# Patient Record
Sex: MALE | Race: BLACK OR AFRICAN AMERICAN | NOT HISPANIC OR LATINO
[De-identification: names, ages, dates, MRNs, and addresses within clinical notes are randomized per-mention and may not be internally consistent; named-entity substitution may affect disease eponyms.]

---

## 2018-03-23 ENCOUNTER — HOSPITAL (OUTPATIENT)
Dept: OTHER | Age: 38
End: 2018-03-23
Attending: EMERGENCY MEDICINE

## 2020-09-16 ENCOUNTER — OFFICE VISIT (OUTPATIENT)
Dept: OCCUPATIONAL MEDICINE | Age: 40
End: 2020-09-16
Attending: EMERGENCY MEDICINE

## 2022-06-29 ENCOUNTER — OCC HEALTH (OUTPATIENT)
Dept: OCCUPATIONAL MEDICINE | Age: 42
End: 2022-06-29
Attending: PHYSICIAN ASSISTANT

## 2022-06-29 ENCOUNTER — HOSPITAL ENCOUNTER (OUTPATIENT)
Dept: GENERAL RADIOLOGY | Age: 42
Discharge: HOME | End: 2022-06-29
Attending: PHYSICIAN ASSISTANT

## 2022-06-29 DIAGNOSIS — S60.112A CONTUSION OF LEFT THUMB WITH DAMAGE TO NAIL, INITIAL ENCOUNTER: Primary | ICD-10-CM

## 2022-06-29 DIAGNOSIS — S60.112A CONTUSION OF LEFT THUMB WITH DAMAGE TO NAIL, INITIAL ENCOUNTER: ICD-10-CM

## 2022-06-29 PROCEDURE — 73130 X-RAY EXAM OF HAND: CPT | Performed by: PHYSICIAN ASSISTANT

## 2022-07-22 ENCOUNTER — OCC HEALTH (OUTPATIENT)
Dept: OCCUPATIONAL MEDICINE | Age: 42
End: 2022-07-22
Attending: PHYSICIAN ASSISTANT

## 2022-07-22 DIAGNOSIS — S60.012D CONTUSION OF LEFT THUMB WITHOUT DAMAGE TO NAIL, SUBSEQUENT ENCOUNTER: Primary | ICD-10-CM

## 2022-10-29 ENCOUNTER — LAB ENCOUNTER (OUTPATIENT)
Dept: LAB | Age: 42
End: 2022-10-29
Attending: PHYSICIAN ASSISTANT
Payer: COMMERCIAL

## 2022-10-29 ENCOUNTER — OFFICE VISIT (OUTPATIENT)
Dept: INTERNAL MEDICINE CLINIC | Facility: CLINIC | Age: 42
End: 2022-10-29
Payer: COMMERCIAL

## 2022-10-29 VITALS
RESPIRATION RATE: 16 BRPM | TEMPERATURE: 98 F | WEIGHT: 295 LBS | BODY MASS INDEX: 34.83 KG/M2 | OXYGEN SATURATION: 96 % | HEIGHT: 77 IN | SYSTOLIC BLOOD PRESSURE: 126 MMHG | DIASTOLIC BLOOD PRESSURE: 76 MMHG | HEART RATE: 78 BPM

## 2022-10-29 DIAGNOSIS — E11.69 ERECTILE DYSFUNCTION ASSOCIATED WITH TYPE 2 DIABETES MELLITUS (HCC): ICD-10-CM

## 2022-10-29 DIAGNOSIS — Z00.00 ANNUAL PHYSICAL EXAM: Primary | ICD-10-CM

## 2022-10-29 DIAGNOSIS — Z00.00 ANNUAL PHYSICAL EXAM: ICD-10-CM

## 2022-10-29 DIAGNOSIS — N52.1 ERECTILE DYSFUNCTION ASSOCIATED WITH TYPE 2 DIABETES MELLITUS (HCC): ICD-10-CM

## 2022-10-29 DIAGNOSIS — E11.65 TYPE 2 DIABETES MELLITUS WITH HYPERGLYCEMIA, WITHOUT LONG-TERM CURRENT USE OF INSULIN (HCC): ICD-10-CM

## 2022-10-29 DIAGNOSIS — E66.9 OBESITY (BMI 30-39.9): ICD-10-CM

## 2022-10-29 DIAGNOSIS — R79.89 LOW TESTOSTERONE: ICD-10-CM

## 2022-10-29 LAB
ALT SERPL-CCNC: 32 U/L
ANION GAP SERPL CALC-SCNC: 3 MMOL/L (ref 0–18)
AST SERPL-CCNC: 19 U/L (ref 15–37)
BUN BLD-MCNC: 11 MG/DL (ref 7–18)
CALCIUM BLD-MCNC: 9.6 MG/DL (ref 8.5–10.1)
CHLORIDE SERPL-SCNC: 103 MMOL/L (ref 98–112)
CHOLEST SERPL-MCNC: 217 MG/DL (ref ?–200)
CO2 SERPL-SCNC: 31 MMOL/L (ref 21–32)
COMPLEXED PSA SERPL-MCNC: 0.64 NG/ML (ref ?–4)
CREAT BLD-MCNC: 1.14 MG/DL
CREAT UR-SCNC: 186 MG/DL
EST. AVERAGE GLUCOSE BLD GHB EST-MCNC: 292 MG/DL (ref 68–126)
FASTING PATIENT LIPID ANSWER: YES
FASTING STATUS PATIENT QL REPORTED: YES
GFR SERPLBLD BASED ON 1.73 SQ M-ARVRAT: 82 ML/MIN/1.73M2 (ref 60–?)
GLUCOSE BLD-MCNC: 186 MG/DL (ref 70–99)
HBA1C MFR BLD: 11.8 % (ref ?–5.7)
HCV AB SERPL QL IA: NONREACTIVE
HDLC SERPL-MCNC: 35 MG/DL (ref 40–59)
LDLC SERPL CALC-MCNC: 149 MG/DL (ref ?–100)
MICROALBUMIN UR-MCNC: 2.12 MG/DL
MICROALBUMIN/CREAT 24H UR-RTO: 11.4 UG/MG (ref ?–30)
NONHDLC SERPL-MCNC: 182 MG/DL (ref ?–130)
OSMOLALITY SERPL CALC.SUM OF ELEC: 288 MOSM/KG (ref 275–295)
POTASSIUM SERPL-SCNC: 4.6 MMOL/L (ref 3.5–5.1)
SODIUM SERPL-SCNC: 137 MMOL/L (ref 136–145)
TRIGL SERPL-MCNC: 180 MG/DL (ref 30–149)
TSI SER-ACNC: 0.65 MIU/ML (ref 0.36–3.74)
VLDLC SERPL CALC-MCNC: 34 MG/DL (ref 0–30)

## 2022-10-29 PROCEDURE — 3008F BODY MASS INDEX DOCD: CPT | Performed by: PHYSICIAN ASSISTANT

## 2022-10-29 PROCEDURE — 99204 OFFICE O/P NEW MOD 45 MIN: CPT | Performed by: PHYSICIAN ASSISTANT

## 2022-10-29 PROCEDURE — 3078F DIAST BP <80 MM HG: CPT | Performed by: PHYSICIAN ASSISTANT

## 2022-10-29 PROCEDURE — 84460 ALANINE AMINO (ALT) (SGPT): CPT | Performed by: PHYSICIAN ASSISTANT

## 2022-10-29 PROCEDURE — 82043 UR ALBUMIN QUANTITATIVE: CPT | Performed by: PHYSICIAN ASSISTANT

## 2022-10-29 PROCEDURE — 82570 ASSAY OF URINE CREATININE: CPT | Performed by: PHYSICIAN ASSISTANT

## 2022-10-29 PROCEDURE — 80048 BASIC METABOLIC PNL TOTAL CA: CPT | Performed by: PHYSICIAN ASSISTANT

## 2022-10-29 PROCEDURE — 84450 TRANSFERASE (AST) (SGOT): CPT | Performed by: PHYSICIAN ASSISTANT

## 2022-10-29 PROCEDURE — 84153 ASSAY OF PSA TOTAL: CPT | Performed by: PHYSICIAN ASSISTANT

## 2022-10-29 PROCEDURE — 3074F SYST BP LT 130 MM HG: CPT | Performed by: PHYSICIAN ASSISTANT

## 2022-10-29 PROCEDURE — 90677 PCV20 VACCINE IM: CPT | Performed by: PHYSICIAN ASSISTANT

## 2022-10-29 PROCEDURE — 80061 LIPID PANEL: CPT | Performed by: PHYSICIAN ASSISTANT

## 2022-10-29 PROCEDURE — 86803 HEPATITIS C AB TEST: CPT | Performed by: PHYSICIAN ASSISTANT

## 2022-10-29 PROCEDURE — 90471 IMMUNIZATION ADMIN: CPT | Performed by: PHYSICIAN ASSISTANT

## 2022-10-29 PROCEDURE — 84443 ASSAY THYROID STIM HORMONE: CPT | Performed by: PHYSICIAN ASSISTANT

## 2022-10-29 PROCEDURE — 83036 HEMOGLOBIN GLYCOSYLATED A1C: CPT | Performed by: PHYSICIAN ASSISTANT

## 2022-10-29 PROCEDURE — 99386 PREV VISIT NEW AGE 40-64: CPT | Performed by: PHYSICIAN ASSISTANT

## 2022-10-29 RX ORDER — SILDENAFIL 50 MG/1
50 TABLET, FILM COATED ORAL
Qty: 9 TABLET | Refills: 0 | Status: SHIPPED | OUTPATIENT
Start: 2022-10-29

## 2022-10-29 NOTE — PATIENT INSTRUCTIONS
Blood work today. See Dr. Alvina Deleon for a DIABETIC eye exam.    Start sildenafil (Viagra) for erectile dysfunction. Use [employer] coupon at pharmacy. Please focus on a diet consisting of lean or plant based proteins, fruits, veggies, and whole grains, as well as regular exercise (30 minutes daily). Follow up visit in 3-6 months (sooner pending lab results).

## 2022-10-31 ENCOUNTER — TELEPHONE (OUTPATIENT)
Dept: ENDOCRINOLOGY CLINIC | Facility: CLINIC | Age: 42
End: 2022-10-31

## 2022-10-31 DIAGNOSIS — E11.69 HYPERLIPIDEMIA ASSOCIATED WITH TYPE 2 DIABETES MELLITUS (HCC): ICD-10-CM

## 2022-10-31 DIAGNOSIS — E11.65 UNCONTROLLED TYPE 2 DIABETES MELLITUS WITH HYPERGLYCEMIA (HCC): Primary | ICD-10-CM

## 2022-10-31 DIAGNOSIS — E78.5 HYPERLIPIDEMIA ASSOCIATED WITH TYPE 2 DIABETES MELLITUS (HCC): ICD-10-CM

## 2022-10-31 RX ORDER — ATORVASTATIN CALCIUM 20 MG/1
20 TABLET, FILM COATED ORAL NIGHTLY
Qty: 90 TABLET | Refills: 1 | Status: SHIPPED | OUTPATIENT
Start: 2022-10-31

## 2022-11-02 ENCOUNTER — TELEPHONE (OUTPATIENT)
Dept: INTERNAL MEDICINE CLINIC | Facility: CLINIC | Age: 42
End: 2022-11-02

## 2022-11-02 ENCOUNTER — OFFICE VISIT (OUTPATIENT)
Dept: ENDOCRINOLOGY CLINIC | Facility: CLINIC | Age: 42
End: 2022-11-02
Payer: COMMERCIAL

## 2022-11-02 VITALS
RESPIRATION RATE: 16 BRPM | DIASTOLIC BLOOD PRESSURE: 86 MMHG | WEIGHT: 298 LBS | HEART RATE: 88 BPM | HEIGHT: 77 IN | BODY MASS INDEX: 35.19 KG/M2 | SYSTOLIC BLOOD PRESSURE: 138 MMHG

## 2022-11-02 DIAGNOSIS — E78.5 DYSLIPIDEMIA: ICD-10-CM

## 2022-11-02 DIAGNOSIS — E66.9 OBESITY (BMI 30-39.9): ICD-10-CM

## 2022-11-02 DIAGNOSIS — E11.65 TYPE 2 DIABETES MELLITUS WITH HYPERGLYCEMIA, WITHOUT LONG-TERM CURRENT USE OF INSULIN (HCC): Primary | ICD-10-CM

## 2022-11-02 LAB
GLUCOSE BLOOD: 280
TEST STRIP LOT #: NORMAL NUMERIC

## 2022-11-02 PROCEDURE — 3075F SYST BP GE 130 - 139MM HG: CPT | Performed by: NURSE PRACTITIONER

## 2022-11-02 PROCEDURE — 3008F BODY MASS INDEX DOCD: CPT | Performed by: NURSE PRACTITIONER

## 2022-11-02 PROCEDURE — 82947 ASSAY GLUCOSE BLOOD QUANT: CPT | Performed by: NURSE PRACTITIONER

## 2022-11-02 PROCEDURE — 99215 OFFICE O/P EST HI 40 MIN: CPT | Performed by: NURSE PRACTITIONER

## 2022-11-02 PROCEDURE — 3079F DIAST BP 80-89 MM HG: CPT | Performed by: NURSE PRACTITIONER

## 2022-11-02 RX ORDER — SEMAGLUTIDE 1.34 MG/ML
0.5 INJECTION, SOLUTION SUBCUTANEOUS
Qty: 4.5 ML | Refills: 1 | Status: SHIPPED | OUTPATIENT
Start: 2022-11-02

## 2022-11-02 RX ORDER — PEN NEEDLE, DIABETIC 30 GX3/16"
1 NEEDLE, DISPOSABLE MISCELLANEOUS
Qty: 30 EACH | Refills: 0 | Status: SHIPPED | OUTPATIENT
Start: 2022-11-02

## 2022-11-02 RX ORDER — BLOOD SUGAR DIAGNOSTIC
STRIP MISCELLANEOUS
Qty: 300 STRIP | Refills: 1 | Status: SHIPPED | OUTPATIENT
Start: 2022-11-02

## 2022-11-02 RX ORDER — LANCETS 33 GAUGE
EACH MISCELLANEOUS
Qty: 300 EACH | Refills: 1 | Status: SHIPPED | OUTPATIENT
Start: 2022-11-02

## 2022-11-02 NOTE — TELEPHONE ENCOUNTER
Pt currently here for an appointment with Monika Macias   Would like a nurse to call him to go over his lab results.      TY

## 2022-11-02 NOTE — ASSESSMENT & PLAN NOTE
A1c 11.8%   Weight: 295 lb   Diabetes is uncontrolled  Discussed with patient health impact associated with poor glycemic control and discussed importance of better glucose control to prevent onset /progression of DM complications.

## 2023-02-15 ENCOUNTER — OFFICE VISIT (OUTPATIENT)
Dept: ENDOCRINOLOGY CLINIC | Facility: CLINIC | Age: 43
End: 2023-02-15
Payer: COMMERCIAL

## 2023-02-15 VITALS
BODY MASS INDEX: 36 KG/M2 | HEART RATE: 78 BPM | SYSTOLIC BLOOD PRESSURE: 140 MMHG | WEIGHT: 300.81 LBS | DIASTOLIC BLOOD PRESSURE: 78 MMHG | OXYGEN SATURATION: 96 % | RESPIRATION RATE: 18 BRPM

## 2023-02-15 DIAGNOSIS — E11.65 TYPE 2 DIABETES MELLITUS WITH HYPERGLYCEMIA, WITHOUT LONG-TERM CURRENT USE OF INSULIN (HCC): Primary | ICD-10-CM

## 2023-02-15 DIAGNOSIS — E78.5 DYSLIPIDEMIA: ICD-10-CM

## 2023-02-15 DIAGNOSIS — E66.9 OBESITY (BMI 30-39.9): ICD-10-CM

## 2023-02-15 PROBLEM — E66.01 MORBID (SEVERE) OBESITY DUE TO EXCESS CALORIES (HCC): Status: ACTIVE | Noted: 2023-02-15

## 2023-02-15 LAB
CARTRIDGE LOT#: 301 NUMERIC
CREAT UR-SCNC: 276 MG/DL
GLUCOSE BLOOD: 179
HEMOGLOBIN A1C: 7.7 % (ref 4.3–5.6)
MICROALBUMIN UR-MCNC: 11.1 MG/DL
MICROALBUMIN/CREAT 24H UR-RTO: 40.2 UG/MG (ref ?–30)
TEST STRIP LOT #: NORMAL NUMERIC

## 2023-02-15 PROCEDURE — 82043 UR ALBUMIN QUANTITATIVE: CPT | Performed by: NURSE PRACTITIONER

## 2023-02-15 PROCEDURE — 83036 HEMOGLOBIN GLYCOSYLATED A1C: CPT | Performed by: NURSE PRACTITIONER

## 2023-02-15 PROCEDURE — 3077F SYST BP >= 140 MM HG: CPT | Performed by: NURSE PRACTITIONER

## 2023-02-15 PROCEDURE — 82947 ASSAY GLUCOSE BLOOD QUANT: CPT | Performed by: NURSE PRACTITIONER

## 2023-02-15 PROCEDURE — 99214 OFFICE O/P EST MOD 30 MIN: CPT | Performed by: NURSE PRACTITIONER

## 2023-02-15 PROCEDURE — 3078F DIAST BP <80 MM HG: CPT | Performed by: NURSE PRACTITIONER

## 2023-02-15 PROCEDURE — 82570 ASSAY OF URINE CREATININE: CPT | Performed by: NURSE PRACTITIONER

## 2023-02-15 RX ORDER — SEMAGLUTIDE 1.34 MG/ML
1 INJECTION, SOLUTION SUBCUTANEOUS WEEKLY
Qty: 9 ML | Refills: 1 | Status: SHIPPED | OUTPATIENT
Start: 2023-02-15

## 2023-02-15 RX ORDER — ATORVASTATIN CALCIUM 20 MG/1
20 TABLET, FILM COATED ORAL NIGHTLY
Qty: 90 TABLET | Refills: 1 | Status: SHIPPED | OUTPATIENT
Start: 2023-02-15

## 2023-02-16 DIAGNOSIS — E11.65 TYPE 2 DIABETES MELLITUS WITH HYPERGLYCEMIA, WITHOUT LONG-TERM CURRENT USE OF INSULIN (HCC): Primary | ICD-10-CM

## 2023-02-28 ENCOUNTER — TELEPHONE (OUTPATIENT)
Dept: INTERNAL MEDICINE CLINIC | Facility: CLINIC | Age: 43
End: 2023-02-28

## 2023-02-28 NOTE — TELEPHONE ENCOUNTER
Pt walked into the clinic and provided documentation from his employer. 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 Pt needs this letter by next week, if possible. Pt advised of 14-day turn around time but informed him that I will mary as a high priority. Pt also completed a medical records request form due to the document requesting medical records, a copy of the document has been placed in Shiela's fax file for reference. Pt requesting a call once the letter is available to be picked up. [phone]. (ok to leave detailed voicemail if pt doesn't answer).

## 2023-03-01 NOTE — TELEPHONE ENCOUNTER
Letter written to address necessary documentation. Copies of recent office notes and labs printed (discussed with Terra Brody who is aware we are including her OV notes as she is managing his DM). Paperwork given to  (Abraham Simms) -- please call patient and notify him ready for .

## 2023-05-16 NOTE — TELEPHONE ENCOUNTER
Pt  Called in stating he talked with Myesha Paul previously about increasing ozempic dose. He is tolerating 1 mg dose well and would like to increase. Will pend if provider in agreement. LOV: 02/2023  Future Appointments   Date Time Provider Ricardo Steele   7/19/2023  3:00 PM AVELION Sesay [facility] [place]     Last A1c value was 7.7% done 2/15/2023.

## 2023-06-13 NOTE — TELEPHONE ENCOUNTER
Pt called in requesting ozempic 2 mg refill for 84 day supply. Tolerating well. LOV: 02/15/2023  Future Appointments   Date Time Provider Ricardo Steele   7/19/2023  3:15 PM AVELINO Yeboah [facility] [place]     Last A1c value was 7.7% done 2/15/2023.

## 2023-06-26 ENCOUNTER — TELEPHONE (OUTPATIENT)
Dept: ENDOCRINOLOGY CLINIC | Facility: CLINIC | Age: 43
End: 2023-06-26

## 2023-06-26 NOTE — TELEPHONE ENCOUNTER
Received Therapeutic alert for pt atorvatatin 20 mg pt was given 90 day rx 2/15/23 faxed that bck too [phone]

## 2023-07-19 ENCOUNTER — OFFICE VISIT (OUTPATIENT)
Dept: ENDOCRINOLOGY CLINIC | Facility: CLINIC | Age: 43
End: 2023-07-19
Payer: COMMERCIAL

## 2023-07-19 VITALS
RESPIRATION RATE: 17 BRPM | DIASTOLIC BLOOD PRESSURE: 64 MMHG | BODY MASS INDEX: 35 KG/M2 | HEART RATE: 99 BPM | SYSTOLIC BLOOD PRESSURE: 132 MMHG | OXYGEN SATURATION: 100 % | WEIGHT: 291.38 LBS

## 2023-07-19 DIAGNOSIS — E78.5 DYSLIPIDEMIA: ICD-10-CM

## 2023-07-19 DIAGNOSIS — R80.9 URINE PROTEIN INCREASED: ICD-10-CM

## 2023-07-19 DIAGNOSIS — E66.9 OBESITY (BMI 30-39.9): ICD-10-CM

## 2023-07-19 DIAGNOSIS — E11.65 TYPE 2 DIABETES MELLITUS WITH HYPERGLYCEMIA, WITHOUT LONG-TERM CURRENT USE OF INSULIN (HCC): Primary | ICD-10-CM

## 2023-07-19 LAB
CARTRIDGE LOT#: 587 NUMERIC
CREAT UR-SCNC: 319 MG/DL
GLUCOSE BLOOD: 177
HEMOGLOBIN A1C: 6.4 % (ref 4.3–5.6)
MICROALBUMIN UR-MCNC: 2.03 MG/DL
MICROALBUMIN/CREAT 24H UR-RTO: 6.4 UG/MG (ref ?–30)
TEST STRIP LOT #: NORMAL NUMERIC

## 2023-07-19 PROCEDURE — 83036 HEMOGLOBIN GLYCOSYLATED A1C: CPT | Performed by: NURSE PRACTITIONER

## 2023-07-19 PROCEDURE — 82043 UR ALBUMIN QUANTITATIVE: CPT | Performed by: NURSE PRACTITIONER

## 2023-07-19 PROCEDURE — 82570 ASSAY OF URINE CREATININE: CPT | Performed by: NURSE PRACTITIONER

## 2023-07-19 PROCEDURE — 3078F DIAST BP <80 MM HG: CPT | Performed by: NURSE PRACTITIONER

## 2023-07-19 PROCEDURE — 3075F SYST BP GE 130 - 139MM HG: CPT | Performed by: NURSE PRACTITIONER

## 2023-07-19 PROCEDURE — 99214 OFFICE O/P EST MOD 30 MIN: CPT | Performed by: NURSE PRACTITIONER

## 2023-07-19 PROCEDURE — 82947 ASSAY GLUCOSE BLOOD QUANT: CPT | Performed by: NURSE PRACTITIONER

## 2023-07-19 RX ORDER — ATORVASTATIN CALCIUM 20 MG/1
20 TABLET, FILM COATED ORAL NIGHTLY
Qty: 90 TABLET | Refills: 1 | Status: SHIPPED | OUTPATIENT
Start: 2023-07-19

## 2023-09-20 ENCOUNTER — TELEPHONE (OUTPATIENT)
Dept: ENDOCRINOLOGY CLINIC | Facility: CLINIC | Age: 43
End: 2023-09-20

## 2023-09-20 RX ORDER — SEMAGLUTIDE 2.68 MG/ML
2 INJECTION, SOLUTION SUBCUTANEOUS WEEKLY
Qty: 9 ML | Refills: 0 | Status: SHIPPED | OUTPATIENT
Start: 2023-09-20

## 2023-09-20 NOTE — TELEPHONE ENCOUNTER
Called pt back - he just needed ozempic filled which I have approved from the refill basket this morning.  Pt is all set

## 2023-09-20 NOTE — TELEPHONE ENCOUNTER
Requested Prescriptions     Pending Prescriptions Disp Refills    OZEMPIC, 2 MG/DOSE, 8 MG/3ML Subcutaneous Solution Pen-injector [Pharmacy Med Name: Fernando Ashley 2MG PER DOSE (1X8MG PEN)] 9 mL 0     Sig: INJECT 2MG INTO THE SKIN ONCE A WEEK AS DIRECTED     Your appointments       Date & Time Appointment Department [facility])    Arslan 10, 2024  2:30 PM [facility] Diabetes Pump follow up with Candace Leger, [address] [place] [address] Dr Parkinson, [facility]  [address]  [facility] [id number] [phone]          Last A1c value was 6.4% done 7/19/2023.     Refill 6/13/23  LOV 7/19/23

## 2023-12-12 RX ORDER — SEMAGLUTIDE 2.68 MG/ML
2 INJECTION, SOLUTION SUBCUTANEOUS WEEKLY
Qty: 9 ML | Refills: 0 | OUTPATIENT
Start: 2023-12-12

## 2023-12-12 RX ORDER — SEMAGLUTIDE 2.68 MG/ML
2 INJECTION, SOLUTION SUBCUTANEOUS WEEKLY
Qty: 9 ML | Refills: 0 | Status: SHIPPED | OUTPATIENT
Start: 2023-12-12

## 2023-12-12 NOTE — TELEPHONE ENCOUNTER
Requested Prescriptions     Pending Prescriptions Disp Refills    OZEMPIC, 2 MG/DOSE, 8 MG/3ML Subcutaneous Solution Pen-injector [Pharmacy Med Name: OZEMPIC 2MG PER DOSE (8MG/3ML) PFP] 9 mL 0     Sig: INJECT 2MG INTO THE SKIN ONCE A WEEK     Your appointments       Date & Time Appointment Department ([place])    Arslan 10, 2024  2:30 PM CST Diabetes Pump follow up with Nadine Ocasio APRN [facility] ([facility])              [facility]  [address]  [facility] [address] [phone]          Last A1c value was 6.4% done 7/19/2023.    Refill 9/20/23  LOV 7/19/23

## 2023-12-12 NOTE — TELEPHONE ENCOUNTER
Pt called in stating he has called 3 times regarding ozempic refill request.  This is the first message left on nurse line. Okay to refill? Please advise and I will call patient back, thank you. LOV: 07/2023  Future Appointments   Date Time Provider Ricardo Steele   1/10/2024  2:30 PM AVELINO Esposito [facility]     Last A1c value was 6.4% done 7/19/2023.

## 2023-12-12 NOTE — TELEPHONE ENCOUNTER
Orders Placed This Encounter    semaglutide (OZEMPIC, 2 MG/DOSE,) 8 MG/3ML Subcutaneous Solution Pen-injector     Sig: Inject 2 mg into the skin once a week.      Dispense:  9 mL     Refill:  0

## 2024-01-08 RX ORDER — ATORVASTATIN CALCIUM 20 MG/1
20 TABLET, FILM COATED ORAL NIGHTLY
Qty: 90 TABLET | Refills: 1 | Status: SHIPPED | OUTPATIENT
Start: 2024-01-08

## 2024-01-08 NOTE — TELEPHONE ENCOUNTER
Requested Prescriptions     Pending Prescriptions Disp Refills    ATORVASTATIN 20 MG Oral Tab [Pharmacy Med Name: ATORVASTATIN 20MG TABLETS] 90 tablet 1     Sig: TAKE 1 TABLET(20 MG) BY MOUTH EVERY NIGHT     Your appointments       Date & Time Appointment Department ([place])    Arslan 10, 2024  2:30 PM CST Diabetes Pump follow up with Nadine Ocasio APRN [facility] ([facility])              [facility]  [address]  [facility] [address]  [phone]          HGBA1C:    Lab Results   Component Value Date    A1C 6.4 (A) 07/19/2023    A1C 7.7 (A) 02/15/2023    A1C 11.8 (H) 10/29/2022     (H) 10/29/2022     LOV: 7-19-23    REFILL: 7-19-23

## 2024-02-28 ENCOUNTER — TELEPHONE (OUTPATIENT)
Dept: ENDOCRINOLOGY CLINIC | Facility: CLINIC | Age: 44
End: 2024-02-28

## 2024-02-29 RX ORDER — SEMAGLUTIDE 2.68 MG/ML
2 INJECTION, SOLUTION SUBCUTANEOUS WEEKLY
Qty: 9 ML | Refills: 0 | Status: SHIPPED | OUTPATIENT
Start: 2024-02-29 | End: 2024-03-01

## 2024-02-29 RX ORDER — ATORVASTATIN CALCIUM 20 MG/1
20 TABLET, FILM COATED ORAL NIGHTLY
Qty: 90 TABLET | Refills: 1 | Status: SHIPPED | OUTPATIENT
Start: 2024-02-29

## 2024-02-29 NOTE — TELEPHONE ENCOUNTER
Requested Prescriptions     Pending Prescriptions Disp Refills    semaglutide (OZEMPIC, 2 MG/DOSE,) 8 MG/3ML Subcutaneous Solution Pen-injector 9 mL 0     Sig: Inject 2 mg into the skin once a week.    atorvastatin 20 MG Oral Tab 90 tablet 1     Sig: Take 1 tablet (20 mg total) by mouth nightly.     Your appointments       Date & Time Appointment Department ([place])    Mar 20, 2024  1:45 PM CDT Diabetes Pump follow up with Nadine Ocasio APRN [facility] ([facility])              [facility]  [address]  [facility] [address] [phone]          Last A1c value was 6.4% done 7/19/2023.    Refill 12/12/23  LOV 7/19/23

## 2024-03-01 DIAGNOSIS — E11.65 TYPE 2 DIABETES MELLITUS WITH HYPERGLYCEMIA, WITHOUT LONG-TERM CURRENT USE OF INSULIN (HCC): Primary | ICD-10-CM

## 2024-03-04 RX ORDER — SEMAGLUTIDE 2.68 MG/ML
2 INJECTION, SOLUTION SUBCUTANEOUS WEEKLY
Qty: 9 ML | Refills: 0 | Status: SHIPPED | OUTPATIENT
Start: 2024-03-04

## 2024-03-04 NOTE — TELEPHONE ENCOUNTER
Over due for labs and appt  Last refill approved.  My chart message sent      Orders Placed This Encounter    Comp Metabolic Panel (14)     Standing Status:   Future     Standing Expiration Date:   3/4/2025    Hemoglobin A1C     Standing Status:   Future     Standing Expiration Date:   3/4/2025    Lipid Panel     Standing Status:   Future     Standing Expiration Date:   3/4/2025    Microalb/Creat Ratio, Random Urine     Standing Status:   Future     Standing Expiration Date:   3/4/2025    TSH W Reflex To Free T4     Standing Status:   Future     Standing Expiration Date:   3/4/2025     Order Specific Question:   Release to patient     Answer:   Immediate    semaglutide (OZEMPIC, 2 MG/DOSE,) 8 MG/3ML Subcutaneous Solution Pen-injector     Sig: Inject 2 mg into the skin once a week.     Dispense:  9 mL     Refill:  0

## 2024-03-04 NOTE — TELEPHONE ENCOUNTER
Requested Prescriptions     Pending Prescriptions Disp Refills    semaglutide (OZEMPIC, 2 MG/DOSE,) 8 MG/3ML Subcutaneous Solution Pen-injector 9 mL 0     Sig: Inject 2 mg into the skin once a week.     Your appointments       Date & Time Appointment Department ([place])    Mar 20, 2024  1:45 PM CDT Diabetes Pump follow up with Nadine Ocasio APRN [facility] ([facility])              [facility]  [address]  [facility] [address]  [phone]          Last A1c value was 6.4% done 7/19/2023.    Refill 2/29/24  LOV 7/19/23

## 2024-03-19 ENCOUNTER — PATIENT MESSAGE (OUTPATIENT)
Dept: ENDOCRINOLOGY CLINIC | Facility: CLINIC | Age: 44
End: 2024-03-19

## 2024-03-20 NOTE — TELEPHONE ENCOUNTER
From: Jayy Lucio  To: Nadine Ocasio  Sent: 3/19/2024 5:00 PM CDT  Subject: Reschedule    I am looking to reschedule my appointment for tomorrow but the next available day is in July, I saw you said that I pull do a tele visit and you would have me go for my test is that available and would I have to wait until July for that appointment alos

## 2024-03-21 NOTE — TELEPHONE ENCOUNTER
Afternoon appt taken already will need to offer new appt to pt when he messages back or calls office

## 2024-03-30 ENCOUNTER — LAB ENCOUNTER (OUTPATIENT)
Dept: LAB | Age: 44
End: 2024-03-30
Attending: NURSE PRACTITIONER
Payer: COMMERCIAL

## 2024-03-30 DIAGNOSIS — E11.65 TYPE 2 DIABETES MELLITUS WITH HYPERGLYCEMIA, WITHOUT LONG-TERM CURRENT USE OF INSULIN (HCC): ICD-10-CM

## 2024-03-30 LAB
ALBUMIN SERPL-MCNC: 4.5 G/DL (ref 3.2–4.8)
ALBUMIN/GLOB SERPL: 1.6 {RATIO} (ref 1–2)
ALP LIVER SERPL-CCNC: 52 U/L
ALT SERPL-CCNC: 34 U/L
ANION GAP SERPL CALC-SCNC: 6 MMOL/L (ref 0–18)
AST SERPL-CCNC: 22 U/L (ref ?–34)
BILIRUB SERPL-MCNC: 0.4 MG/DL (ref 0.3–1.2)
BUN BLD-MCNC: 10 MG/DL (ref 9–23)
BUN/CREAT SERPL: 8.7 (ref 10–20)
CALCIUM BLD-MCNC: 9.9 MG/DL (ref 8.7–10.4)
CHLORIDE SERPL-SCNC: 104 MMOL/L (ref 98–112)
CHOLEST SERPL-MCNC: 193 MG/DL (ref ?–200)
CO2 SERPL-SCNC: 30 MMOL/L (ref 21–32)
CREAT BLD-MCNC: 1.15 MG/DL
CREAT UR-SCNC: 213.2 MG/DL
EGFRCR SERPLBLD CKD-EPI 2021: 80 ML/MIN/1.73M2 (ref 60–?)
EST. AVERAGE GLUCOSE BLD GHB EST-MCNC: 131 MG/DL (ref 68–126)
FASTING PATIENT LIPID ANSWER: YES
FASTING STATUS PATIENT QL REPORTED: YES
GLOBULIN PLAS-MCNC: 2.8 G/DL (ref 2.8–4.4)
GLUCOSE BLD-MCNC: 120 MG/DL (ref 70–99)
HBA1C MFR BLD: 6.2 % (ref ?–5.7)
HDLC SERPL-MCNC: 32 MG/DL (ref 40–59)
LDLC SERPL CALC-MCNC: 130 MG/DL (ref ?–100)
MICROALBUMIN UR-MCNC: 0.5 MG/DL
MICROALBUMIN/CREAT 24H UR-RTO: 2.3 UG/MG (ref ?–30)
NONHDLC SERPL-MCNC: 161 MG/DL (ref ?–130)
OSMOLALITY SERPL CALC.SUM OF ELEC: 290 MOSM/KG (ref 275–295)
POTASSIUM SERPL-SCNC: 4.1 MMOL/L (ref 3.5–5.1)
PROT SERPL-MCNC: 7.3 G/DL (ref 5.7–8.2)
SODIUM SERPL-SCNC: 140 MMOL/L (ref 136–145)
TRIGL SERPL-MCNC: 171 MG/DL (ref 30–149)
TSI SER-ACNC: 0.78 MIU/ML (ref 0.55–4.78)
VLDLC SERPL CALC-MCNC: 31 MG/DL (ref 0–30)

## 2024-03-30 PROCEDURE — 80053 COMPREHEN METABOLIC PANEL: CPT | Performed by: NURSE PRACTITIONER

## 2024-03-30 PROCEDURE — 84443 ASSAY THYROID STIM HORMONE: CPT | Performed by: NURSE PRACTITIONER

## 2024-03-30 PROCEDURE — 82570 ASSAY OF URINE CREATININE: CPT | Performed by: NURSE PRACTITIONER

## 2024-03-30 PROCEDURE — 83036 HEMOGLOBIN GLYCOSYLATED A1C: CPT | Performed by: NURSE PRACTITIONER

## 2024-03-30 PROCEDURE — 80061 LIPID PANEL: CPT | Performed by: NURSE PRACTITIONER

## 2024-03-30 PROCEDURE — 82043 UR ALBUMIN QUANTITATIVE: CPT | Performed by: NURSE PRACTITIONER

## 2024-04-18 ENCOUNTER — TELEMEDICINE (OUTPATIENT)
Dept: ENDOCRINOLOGY CLINIC | Facility: CLINIC | Age: 44
End: 2024-04-18
Payer: COMMERCIAL

## 2024-04-18 DIAGNOSIS — E11.69 ERECTILE DYSFUNCTION ASSOCIATED WITH TYPE 2 DIABETES MELLITUS (HCC): ICD-10-CM

## 2024-04-18 DIAGNOSIS — E78.5 DYSLIPIDEMIA: ICD-10-CM

## 2024-04-18 DIAGNOSIS — N52.1 ERECTILE DYSFUNCTION ASSOCIATED WITH TYPE 2 DIABETES MELLITUS (HCC): ICD-10-CM

## 2024-04-18 DIAGNOSIS — E11.65 TYPE 2 DIABETES MELLITUS WITH HYPERGLYCEMIA, WITHOUT LONG-TERM CURRENT USE OF INSULIN (HCC): ICD-10-CM

## 2024-04-18 DIAGNOSIS — E66.9 OBESITY (BMI 30-39.9): Primary | ICD-10-CM

## 2024-04-18 PROCEDURE — 99214 OFFICE O/P EST MOD 30 MIN: CPT | Performed by: NURSE PRACTITIONER

## 2024-04-18 RX ORDER — SILDENAFIL 50 MG/1
50 TABLET, FILM COATED ORAL
Qty: 9 TABLET | Refills: 2 | Status: SHIPPED | OUTPATIENT
Start: 2024-04-18

## 2024-04-18 RX ORDER — SEMAGLUTIDE 2.68 MG/ML
2 INJECTION, SOLUTION SUBCUTANEOUS WEEKLY
Qty: 9 ML | Refills: 1 | Status: SHIPPED | OUTPATIENT
Start: 2024-04-18

## 2024-04-18 RX ORDER — ATORVASTATIN CALCIUM 20 MG/1
20 TABLET, FILM COATED ORAL DAILY
Qty: 90 TABLET | Refills: 3 | Status: SHIPPED | OUTPATIENT
Start: 2024-04-18

## 2024-04-18 NOTE — PATIENT INSTRUCTIONS
A1C 6.2%   Kidney and liver labs look good!    The LDL level is high and does increase your risk of heart disease and stroke  The atorvastatin will help you lower the cholesterol as well as healthy eating   Continue exercise ; and aerobic exercise such as walking, running, cycling, stair master is really good for your heart       Please take the atorvastatin 20mg once daily     Update labs again before  appointment (no need to fast for lab)   Ordered for any [facility] lab location     Please have your eye exam done at [facility]; tell them you have diabetes and they can take a picture of the back of the eye looking for any retina damage (from diabetes)   If you get a copy, upload to my chart or have them send to our office via fax    The eye exam should be done every year with diabetes         Continue Ozempic 2.0mg subcutaneous once weekly

## 2024-04-18 NOTE — PROGRESS NOTES
Jayy Lucio is a 43 year old  presenting for type 2 diabetes management.   Primary care physician: Alise Khoury,    Most recent  A1C 6.2% ( last A1C 6.4% )   Last Diabetes follow up: 7-2023    Recent labs; reviewed today   LDL still too high. Pt admits never really taking atrovastatin since he thought diet/exercise could lower cholesterol levels   No side effects in past w atorva rx but jsut \"does not think about it\"       [facility] ; between [employer] and [employer]   Works long hours     Is followed by testosterone clinic - on replacement T ( + ED)     Diabetes History:  Type 2 DM ~ 2020   Patient has not had hospitalizations for blood sugar issues  denies any history of pancreatitis      Previous DM therapies:  Metformin : + diarrhea   Jardiance : lack of refills     Current DM Regimen:  Ozempic 2.0mg subcutaneous once weekly     HGBA1C:    Lab Results   Component Value Date    A1C 6.2 (H) 03/30/2024    A1C 6.4 (A) 07/19/2023    A1C 7.7 (A) 02/15/2023     (H) 03/30/2024       Lab Results   Component Value Date    CHOLEST 193 03/30/2024    CHOLEST 217 (H) 10/29/2022    TRIG 171 (H) 03/30/2024    TRIG 180 (H) 10/29/2022    HDL 32 (L) 03/30/2024    HDL 35 (L) 10/29/2022     (H) 03/30/2024     (H) 10/29/2022     Lab Results   Component Value Date    MICROALBCREA 2.3 03/30/2024    MICROALBCREA 6.4 07/19/2023      Lab Results   Component Value Date    CREATSERUM 1.15 03/30/2024    CREATSERUM 1.14 10/29/2022    EGFRCR 80 03/30/2024    EGFRCR 82 10/29/2022     Lab Results   Component Value Date    AST 22 03/30/2024    AST 19 10/29/2022    ALT 34 03/30/2024    ALT 32 10/29/2022       Lab Results   Component Value Date    TSH 0.777 03/30/2024    TSH 0.651 10/29/2022           DM Complications:  Microvascular:   Neuropathy: yes  Retinopathy: no  Nephropathy: no      Macrovascular:  PVD: no  CAD: no  Stroke/CVA: no        Modifying factors:  Medication adherence: yes     Recent steroids, illness or infections ( past 3m): no     Allergies: Metformin    Past Medical History:    Diabetes (HCC)    Low testosterone    Obesity    RENÉE (obstructive sleep apnea)    AHI-15     Past Surgical History:   Procedure Laterality Date    Oral surgery procedure  2009    \"1 wisdom tooth\"    Other  2012    s/p Lasik eye surgery     Social History     Socioeconomic History    Marital status: Single   Tobacco Use    Smoking status: Never    Smokeless tobacco: Never   Vaping Use    Vaping status: Never Used   Substance and Sexual Activity    Alcohol use: No    Drug use: No   Other Topics Concern    Caffeine Concern No    Exercise Yes     Comment: 5 x times a week, cardio and weights     Social Determinants of Health      Received from [facility]     Family History   Problem Relation Age of Onset    Anemia Father         sickle cell anemia (trait)    Anemia Mother         sickle cell anemia (trait)    Other (Dementia) Maternal Grandmother     Anemia Sister         trait for sickle cell anemia, sisters    Anemia Brother         sickle cell anemia    Diabetes Neg     Hypertension Neg     Heart Disorder Neg     Cancer Neg     Stroke Neg      Current Medication List:   Current Outpatient Medications   Medication Sig Dispense Refill    Sildenafil Citrate 50 MG Oral Tab Take 1 tablet (50 mg total) by mouth daily as needed for Erectile Dysfunction. 9 tablet 2    atorvastatin 20 MG Oral Tab Take 1 tablet (20 mg total) by mouth daily. 90 tablet 3    semaglutide (OZEMPIC, 2 MG/DOSE,) 8 MG/3ML Subcutaneous Solution Pen-injector Inject 2 mg into the skin once a week. 9 mL 1    Glucose Blood (ONETOUCH VERIO) In Vitro Strip Test 3 x daily 300 strip 1    OneTouch Delica Lancets 33G Does not apply Misc Test 3 x daily 300 each 1           DM associated review of  symptoms:   Endocrine: Polyuria, polyphagia, polydipsia: no  Neurological: Paresthesias:  HEENT: Blurred vision: no  Skin: no rash or  wounds  Hematological: Hypoglycemia: no      Review of Systems     LUNGS: denies shortness of breath   CARDIOVASCULAR: denies chest pain  GI: denies abdominal pain, nausea or diarrhea   : denies dysuria. + ED     Physical exam:  There were no vitals taken for this visit.  There is no height or weight on file to calculate BMI.    Physical Exam     Constitutional: Normal appearance   Cardiovascular: not assessed   Pulmonary/Chest: Effort normal  Neurological: Alert and oriented .   Psychiatric: Normal mood and affect.     Assessment/Plan:      Obesity (BMI 30-39.9)  Last DM center weight: 291 lb   Continue lifestyle changes and GLP rx    Dyslipidemia:   Last  labs  3-2024   LDL too high   The 10-year ASCVD risk score (Gera ADAMES, et al., 2019) is: 9.2%    Values used to calculate the score:      Age: 43 years      Sex: Male      Is Non- : Yes      Diabetic: Yes      Tobacco smoker: No      Systolic Blood Pressure: 132 mmHg      Is BP treated: No      HDL Cholesterol: 32 mg/dL      Total Cholesterol: 193 mg/dL    Reviewed CAD risk and statin indication   Patient agreeable to restart atorvastatin and take daily   Atorvastatin 20mg once daily rx   Update CMP, Lipids in 3-4 m       Erectile dysfunction  Refilled viagra today for prn use          Type 2 diabetes mellitus with hyperglycemia, without long-term current use of insulin ([facility])  A1C 6.2 % ( last A1C 6.4% )   Last DM center weight 291 lb  )   Diabetes control is improving but needs ongoing management and evaluation  given the chronicity of Diabetes, ongoing medication monitoring and risk for complications      Reminded pt health impact associated with poor glycemic control and discussed importance of better glucose control to prevent onset /progression of DM complications.   Recommendations:   NO METFORMIN (past intolerance )   Continue:   Ozempic 2.0 mg subcutaneous once weekly       Reviewed  clinical signifiance of A1c, adverse effects  of suboptimal glucose control, and goals of therapy   Discussed the A1C test, what the value reflects and the goal for the patient.   Discussed w patient glucose targets (Fasting < 130 and post prandial <180 ) and complications associated with hyperglycemia and uncontrolled DM (on AVS)   Recommend SMBG checks 2 x weekly  (alternating fasting and varying post prandial times)  Continue with lifestyle modifications due to positive impact on diabetes/blood sugars/health (portion control, physical activity, weight loss)     The patient is asked to return in-- 6 m w me  but recommended to contact DM clinic sooner if questions or concerns.    The patient indicates understanding of these issues and agrees to the plan.    Orders Placed This Encounter    Sildenafil Citrate 50 MG Oral Tab     Sig: Take 1 tablet (50 mg total) by mouth daily as needed for Erectile Dysfunction.     Dispense:  9 tablet     Refill:  2    atorvastatin 20 MG Oral Tab     Sig: Take 1 tablet (20 mg total) by mouth daily.     Dispense:  90 tablet     Refill:  3    semaglutide (OZEMPIC, 2 MG/DOSE,) 8 MG/3ML Subcutaneous Solution Pen-injector     Sig: Inject 2 mg into the skin once a week.     Dispense:  9 mL     Refill:  1     DM quality  A1C/Blood pressure: as reported above   Nephropathy screening: 3-2024 no  indication for ace /arb rx.   LIPID screening: 3-2024   started atorvastatin rx.   Last dilated eye exam: No data recorded Exam shows retinopathy? No data recorded  Last diabetic foot exam: Last Foot Exam: 07/19/23    Reminded to have eye exam

## 2024-05-22 DIAGNOSIS — E11.65 TYPE 2 DIABETES MELLITUS WITH HYPERGLYCEMIA, WITHOUT LONG-TERM CURRENT USE OF INSULIN (HCC): ICD-10-CM

## 2024-05-22 RX ORDER — ATORVASTATIN CALCIUM 20 MG/1
20 TABLET, FILM COATED ORAL DAILY
Qty: 90 TABLET | Refills: 3 | Status: SHIPPED | OUTPATIENT
Start: 2024-05-22

## 2024-05-22 RX ORDER — SEMAGLUTIDE 2.68 MG/ML
2 INJECTION, SOLUTION SUBCUTANEOUS WEEKLY
Qty: 9 ML | Refills: 1 | Status: SHIPPED | OUTPATIENT
Start: 2024-05-22

## 2024-05-22 NOTE — TELEPHONE ENCOUNTER
Requested Prescriptions     Pending Prescriptions Disp Refills    atorvastatin 20 MG Oral Tab 90 tablet 3     Sig: Take 1 tablet (20 mg total) by mouth daily.    semaglutide (OZEMPIC, 2 MG/DOSE,) 8 MG/3ML Subcutaneous Solution Pen-injector 9 mL 1     Sig: Inject 2 mg into the skin once a week.     Your appointments       Date & Time Appointment Department ([place])    Jun 11, 2024 7:30 AM CDT Diabetes Pump follow up with Nadine Ocasio APRN [facility] ([facility])        Nov 14, 2024 11:45 AM CST Video Visit with Nadine Ocasio APRN [address] ([facility] [place])    Please verify your telehealth insurance benefits prior to your appointment.    You must be in the [facility] during the virtual visit.     Please use the [employer] Mobile Wilma and launch the video visit 10 minutes prior to your scheduled appointment time to ensure your camera and microphone are working properly. Once the video visit has started you will be placed in a waiting room until the provider begins the visit.     You will receive an email confirmation with instructions.  If you have questions, call your doctor's office directly.    If you are having issues or need to use a desktop/laptop, please follow the below steps:        1.       Close out all other open apps (could be competing for audio resources)  2.       Disable Bluetooth  3.       Reboot mobile device before joining the video  4.       Come off Wi-Fi and switch over to Data    Please see our Video Visit Tip Sheet if you need additional assistance.     If you believe this is an emergency, please dial 911 immediately.                [address]  [facility] [address] [place] [address]  [address] [place] [id number] [phone] [facility]  [place]  [address]  [facility] [address]  [phone]          Last A1c value was 6.2% done 3/30/2024.    Refill 4/18/24  LOV 4/18/24

## 2024-05-24 ENCOUNTER — PATIENT MESSAGE (OUTPATIENT)
Dept: ENDOCRINOLOGY CLINIC | Facility: CLINIC | Age: 44
End: 2024-05-24

## 2024-05-24 NOTE — TELEPHONE ENCOUNTER
From: Jayy Lucio  To: Nadine Ocasio  Sent: 5/24/2024 1:46 PM CDT  Subject: Call back     I received a call from your office and I wanted to see what they needed

## 2024-09-11 DIAGNOSIS — E11.65 TYPE 2 DIABETES MELLITUS WITH HYPERGLYCEMIA, WITHOUT LONG-TERM CURRENT USE OF INSULIN (HCC): ICD-10-CM

## 2024-09-11 RX ORDER — SEMAGLUTIDE 2.68 MG/ML
2 INJECTION, SOLUTION SUBCUTANEOUS WEEKLY
Qty: 9 ML | Refills: 1 | OUTPATIENT
Start: 2024-09-11

## 2024-11-22 ENCOUNTER — TELEPHONE (OUTPATIENT)
Age: 44
End: 2024-11-22

## 2025-02-18 ENCOUNTER — TELEPHONE (OUTPATIENT)
Facility: CLINIC | Age: 45
End: 2025-02-18

## 2025-02-18 NOTE — TELEPHONE ENCOUNTER
Received fax for Ozempic 2 MG     FERNY RIVERA (Key: [id number])  Ozempic (2 MG/DOSE) 8MG/3ML pen-injectors    Pt also needs a follow up -LOV 4/18/24 LVMTCB under mobile cell

## 2025-02-24 ENCOUNTER — PATIENT MESSAGE (OUTPATIENT)
Facility: CLINIC | Age: 45
End: 2025-02-24

## 2025-02-24 DIAGNOSIS — E11.65 TYPE 2 DIABETES MELLITUS WITH HYPERGLYCEMIA, WITHOUT LONG-TERM CURRENT USE OF INSULIN (HCC): ICD-10-CM

## 2025-02-25 ENCOUNTER — LAB ENCOUNTER (OUTPATIENT)
Dept: LAB | Age: 45
End: 2025-02-25
Attending: NURSE PRACTITIONER
Payer: COMMERCIAL

## 2025-02-25 DIAGNOSIS — E11.65 TYPE 2 DIABETES MELLITUS WITH HYPERGLYCEMIA, WITHOUT LONG-TERM CURRENT USE OF INSULIN (HCC): ICD-10-CM

## 2025-02-25 LAB
ALBUMIN SERPL-MCNC: 5 G/DL (ref 3.2–4.8)
ALBUMIN/GLOB SERPL: 1.7 {RATIO} (ref 1–2)
ALP LIVER SERPL-CCNC: 50 U/L
ALT SERPL-CCNC: 55 U/L
ANION GAP SERPL CALC-SCNC: 0 MMOL/L (ref 0–18)
AST SERPL-CCNC: 26 U/L (ref ?–34)
BILIRUB SERPL-MCNC: 0.3 MG/DL (ref 0.3–1.2)
BUN BLD-MCNC: 13 MG/DL (ref 9–23)
CALCIUM BLD-MCNC: 9.7 MG/DL (ref 8.7–10.6)
CHLORIDE SERPL-SCNC: 105 MMOL/L (ref 98–112)
CHOLEST SERPL-MCNC: 185 MG/DL (ref ?–200)
CO2 SERPL-SCNC: 34 MMOL/L (ref 21–32)
CREAT BLD-MCNC: 1.47 MG/DL
CREAT UR-SCNC: 298.6 MG/DL
EGFRCR SERPLBLD CKD-EPI 2021: 59 ML/MIN/1.73M2 (ref 60–?)
EST. AVERAGE GLUCOSE BLD GHB EST-MCNC: 157 MG/DL (ref 68–126)
FASTING PATIENT LIPID ANSWER: YES
FASTING STATUS PATIENT QL REPORTED: YES
GLOBULIN PLAS-MCNC: 3 G/DL (ref 2–3.5)
GLUCOSE BLD-MCNC: 101 MG/DL (ref 70–99)
HBA1C MFR BLD: 7.1 % (ref ?–5.7)
HDLC SERPL-MCNC: 33 MG/DL (ref 40–59)
LDLC SERPL CALC-MCNC: 119 MG/DL (ref ?–100)
MICROALBUMIN UR-MCNC: 1.3 MG/DL
MICROALBUMIN/CREAT 24H UR-RTO: 4.4 UG/MG (ref ?–30)
NONHDLC SERPL-MCNC: 152 MG/DL (ref ?–130)
OSMOLALITY SERPL CALC.SUM OF ELEC: 288 MOSM/KG (ref 275–295)
POTASSIUM SERPL-SCNC: 4 MMOL/L (ref 3.5–5.1)
PROT SERPL-MCNC: 8 G/DL (ref 5.7–8.2)
SODIUM SERPL-SCNC: 139 MMOL/L (ref 136–145)
TRIGL SERPL-MCNC: 186 MG/DL (ref 30–149)
TSI SER-ACNC: 0.57 UIU/ML (ref 0.55–4.78)
VLDLC SERPL CALC-MCNC: 33 MG/DL (ref 0–30)

## 2025-02-25 PROCEDURE — 82043 UR ALBUMIN QUANTITATIVE: CPT | Performed by: NURSE PRACTITIONER

## 2025-02-25 PROCEDURE — 80061 LIPID PANEL: CPT | Performed by: NURSE PRACTITIONER

## 2025-02-25 PROCEDURE — 84443 ASSAY THYROID STIM HORMONE: CPT | Performed by: NURSE PRACTITIONER

## 2025-02-25 PROCEDURE — 80053 COMPREHEN METABOLIC PANEL: CPT | Performed by: NURSE PRACTITIONER

## 2025-02-25 PROCEDURE — 83036 HEMOGLOBIN GLYCOSYLATED A1C: CPT | Performed by: NURSE PRACTITIONER

## 2025-02-25 PROCEDURE — 82570 ASSAY OF URINE CREATININE: CPT | Performed by: NURSE PRACTITIONER

## 2025-02-25 RX ORDER — SEMAGLUTIDE 2.68 MG/ML
2 INJECTION, SOLUTION SUBCUTANEOUS WEEKLY
Qty: 9 ML | Refills: 0 | Status: SHIPPED | OUTPATIENT
Start: 2025-02-25

## 2025-02-25 NOTE — TELEPHONE ENCOUNTER
Orders Placed This Encounter    Comp Metabolic Panel (14)     Standing Status:   Future     Standing Expiration Date:   2/25/2026    Hemoglobin A1C     Standing Status:   Future     Standing Expiration Date:   2/25/2026    Lipid Panel     Standing Status:   Future     Standing Expiration Date:   2/25/2026    Microalb/Creat Ratio, Random Urine     Standing Status:   Future     Standing Expiration Date:   2/25/2026    TSH W Reflex To Free T4     Standing Status:   Future     Standing Expiration Date:   2/25/2026     Order Specific Question:   Release to patient     Answer:   Immediate    semaglutide (OZEMPIC, 2 MG/DOSE,) 8 MG/3ML Subcutaneous Solution Pen-injector     Sig: Inject 2 mg into the skin once a week.     Dispense:  9 mL     Refill:  0

## 2025-02-25 NOTE — TELEPHONE ENCOUNTER
Dispensed Written Strength Quantity Refills Days Supply Provider Pharmacy    OZEMPIC 2MG 8MG/3ML INJ 02/24/2025  8 3 mL  28 Nadine Ocasio APRN [facility]   OZEMPIC 2MG 8MG/3ML INJ 12/02/2024  8 9 mL  84 Nadine Ocasio APRN [facility]   OZEMPIC 2MG 8MG/3ML INJ 09/11/2024  8 9 mL  84 Nadine Ocasio APRN [facility]       Pt picked up a 90 day supply on 12/02/24 and a 30 day supply 02/24/25     Pended 90 day   Requested Prescriptions     Pending Prescriptions Disp Refills    semaglutide (OZEMPIC, 2 MG/DOSE,) 8 MG/3ML Subcutaneous Solution Pen-injector 9 mL 1     Sig: Inject 2 mg into the skin once a week.     No future appointments.  Last A1c value was 6.2% done 3/30/2024.  Refill 05/22/24 [facility]   LOV 04/18/24 [facility]     RTC 6 months     Pt needs follow up appt

## 2025-02-25 NOTE — TELEPHONE ENCOUNTER
Patient sent My Chart Message back - confirming appointment - scheduled appointment as below- patient also asked about sending updated prescription     Patient also requested call to answer question - call placed to patient - patient advised appointment on 3/3 would not work- rescheduled as below- confirmed to complete labs at [facility]- can go to [place] location, walk-in. Patient advised that he pays $25 for 3 month supply of prescription (also $25 for 1 month supply) patient would prefer 3 month supply- advised should be able to send- will let him know if provider says otherwise.     Last office visit and Provider: 4/18/24 - AVELINO Hanks   Future Appointments   Date Time Provider Department Center   3/12/2025  7:30 AM Nadine Ocasio APRN [facility]   Last A1c value was 6.2% done 3/30/2024.

## 2025-03-11 NOTE — PROGRESS NOTES
Jayy Lucio is a 44 year old  presenting for type 2 diabetes management.   Primary care physician: Alise Khoury DO   Last Diabetes appointment with me: 4-2024   Recent labs with PCP --> Reviewed today   Most recent  A1C 7.1% ( last A1C 6.2% )   Blood sugar 126 mg/dl   Admits he has not been testing. Feels increase in trends attributed to not exercising, holidays and stress w work. Has resumed back to exercise routine recently       Re: Cholesterol levels not at goal - not consistently taking Statin prescription   Admits forgetting and just doesn't take prescription     [facility] and [facility]   Works long hours     Is followed by testosterone clinic - on replacement T ( + ED)     Diabetes History:  Type 2 DM ~ 2020   Patient has not had hospitalizations for blood sugar issues  denies any history of pancreatitis      Previous DM therapies:  Metformin : + diarrhea   Jardiance : lack of refills     Current DM Regimen:  Ozempic 2.0mg subcutaneous once weekly     HGBA1C:    Lab Results   Component Value Date    A1C 7.1 (H) 02/25/2025    A1C 6.2 (H) 03/30/2024    A1C 6.4 (A) 07/19/2023     (H) 02/25/2025       Lab Results   Component Value Date    CHOLEST 185 02/25/2025    CHOLEST 193 03/30/2024    TRIG 186 (H) 02/25/2025    TRIG 171 (H) 03/30/2024    HDL 33 (L) 02/25/2025    HDL 32 (L) 03/30/2024     (H) 02/25/2025     (H) 03/30/2024     Lab Results   Component Value Date    MICROALBCREA 4.4 02/25/2025    MICROALBCREA 2.3 03/30/2024      Lab Results   Component Value Date    CREATSERUM 1.47 (H) 02/25/2025    CREATSERUM 1.15 03/30/2024    EGFRCR 59 (L) 02/25/2025    EGFRCR 80 03/30/2024     Lab Results   Component Value Date    AST 26 02/25/2025    AST 22 03/30/2024    ALT 55 (H) 02/25/2025    ALT 34 03/30/2024       Lab Results   Component Value Date    TSH 0.569 02/25/2025    TSH 0.777 03/30/2024           DM Complications:  Microvascular:    Neuropathy: yes  Retinopathy: no  Nephropathy: no      Macrovascular:  PVD: no  CAD: no  Stroke/CVA: no        Modifying factors:  Medication adherence: yes    Recent steroids, illness or infections ( past 3m): no     Allergies: Metformin    Past Medical History:    Diabetes (HCC)    Low testosterone    Obesity    RENÉE (obstructive sleep apnea)    AHI-15     Past Surgical History:   Procedure Laterality Date    Oral surgery procedure  2009    \"1 wisdom tooth\"    Other  2012    s/p Lasik eye surgery     Social History     Socioeconomic History    Marital status: Single   Tobacco Use    Smoking status: Never    Smokeless tobacco: Never   Vaping Use    Vaping status: Never Used   Substance and Sexual Activity    Alcohol use: No    Drug use: No   Other Topics Concern    Caffeine Concern No    Exercise Yes     Comment: 5 x times a week, cardio and weights     Social Drivers of Health      [facility]     Family History   Problem Relation Age of Onset    Anemia Father         sickle cell anemia (trait)    Anemia Mother         sickle cell anemia (trait)    Other (Dementia) Maternal Grandmother     Anemia Sister         trait for sickle cell anemia, sisters    Anemia Brother         sickle cell anemia    Diabetes Neg     Hypertension Neg     Heart Disorder Neg     Cancer Neg     Stroke Neg      Current Medication List:   Current Outpatient Medications   Medication Sig Dispense Refill    atorvastatin 20 MG Oral Tab Take 1 tablet (20 mg total) by mouth daily. 90 tablet 3    semaglutide (OZEMPIC, 2 MG/DOSE,) 8 MG/3ML Subcutaneous Solution Pen-injector Inject 2 mg into the skin once a week. 9 mL 1    Sildenafil Citrate 50 MG Oral Tab Take 1 tablet (50 mg total) by mouth daily as needed for Erectile Dysfunction. 9 tablet 2    Glucose Blood (ONETOUCH VERIO) In Vitro Strip Test 3 x daily 300 strip 1    OneTouch Delica Lancets 33G Does not apply Misc Test 3 x daily 300 each 1            DM associated review of  symptoms:   Endocrine: Polyuria, polyphagia, polydipsia: no  Neurological: Paresthesias:  HEENT: Blurred vision: no  Skin: no rash or wounds  Hematological: Hypoglycemia: no      Review of Systems     LUNGS: denies shortness of breath   CARDIOVASCULAR: denies chest pain  GI: denies abdominal pain, nausea or diarrhea   : denies dysuria. + ED     Physical exam:  /70   Pulse 84   Resp 17   Wt 290 lb (131.5 kg)   SpO2 96%   BMI 34.39 kg/m²   Body mass index is 34.39 kg/m².    Physical Exam     Constitutional: Normal appearance   Cardiovascular: normal rate, rhythm   Pulmonary/Chest: Effort normal  Neurological: Alert and oriented .   Psychiatric: Normal mood and affect.   Musculoskeletal:  Diabetes foot exam:   Good foot hygiene.   Bilateral barefoot skin diabetic exam: L foot w scant area of blood at tip of #1 toe nail edge. No signs of erythema or edema. Rest of foot normal.  Visualized feet and the appearance : normal.  Bilateral monofilament/sensation is normal. Vibration to dorsum to the first toe NOT perceived.   Bilateral 2+ pedal pulse pedal pulse exam       Assessment/Plan:      Obesity (BMI 30-39.9)  Last DM center weight: 291 lb   Continue lifestyle changes and GLP rx    Dyslipidemia:   Last  labs  2.2025   LDL too high   The 10-year ASCVD risk score (Gera DK, et al., 2019) is: 9%    Values used to calculate the score:      Age: 44 years      Sex: Male      Is Non- : Yes      Diabetic: Yes      Tobacco smoker: No      Systolic Blood Pressure: 128 mmHg      Is BP treated: No      HDL Cholesterol: 33 mg/dL      Total Cholesterol: 185 mg/dL    Reviewed CAD risk and statin indication   Reminded patient importance of atorvastatin adherence  Move to AM dosing for adherence.   Continue Atorvastatin 20mg once daily    Update CMP, Lipids in 3-4 m     Info provided on [place] [facility] calcium screening       Type 2 diabetes mellitus with  hyperglycemia, without long-term current use of insulin ([facility])  A1C 7.1 % ( last A1C 6.2% )   Weight: 291 lb (Last DM center weight 291 lb  )   Diabetes control is increased; needs ongoing management and evaluation  given the chronicity of Diabetes, ongoing medication monitoring and risk for complications      Reminded pt health impact associated with poor glycemic control and discussed importance of better glucose control to prevent onset /progression of DM complications.   Recommendations:   NO METFORMIN (past intolerance )   Continue:   Ozempic 2.0 mg subcutaneous once weekly     Consider revisiting SGLT2i prescription if trends do not improve     Reviewed  clinical signifiance of A1c, adverse effects of suboptimal glucose control, and goals of therapy   Discussed the A1C test, what the value reflects and the goal for the patient.   Discussed w patient glucose targets (Fasting < 130 and post prandial <180 ) and complications associated with hyperglycemia and uncontrolled DM (on AVS)   Recommend SMBG checks 2 x weekly  (alternating fasting and varying post prandial times)  Continue with lifestyle modifications due to positive impact on diabetes/blood sugars/health (portion control, physical activity, weight loss)   Recheck kidney labs in 3m   The patient is asked to return in-4 m w me  but recommended to contact DM clinic sooner if questions or concerns.    The patient indicates understanding of these issues and agrees to the plan.    Orders Placed This Encounter    Comp Metabolic Panel (14)     Standing Status:   Future     Standing Expiration Date:   3/12/2026    Hemoglobin A1C     Standing Status:   Future     Standing Expiration Date:   3/12/2026    Lipid Panel     Standing Status:   Future     Standing Expiration Date:   3/12/2026    ASSAY QUANTITATIVE, GLUCOSE     Order Specific Question:   Release to patient     Answer:   Immediate    Diabetic Retinopathy Exam  OU - Both Eyes     Standing Status:   Future      Number of Occurrences:   1     Standing Expiration Date:   3/12/2026    atorvastatin 20 MG Oral Tab     Sig: Take 1 tablet (20 mg total) by mouth daily.     Dispense:  90 tablet     Refill:  3    semaglutide (OZEMPIC, 2 MG/DOSE,) 8 MG/3ML Subcutaneous Solution Pen-injector     Sig: Inject 2 mg into the skin once a week.     Dispense:  9 mL     Refill:  1     Diabetes complications & risks surveillance:   A1C/Blood pressure: as reported    Last dilated eye exam: Last Dilated Eye Exam: 03/12/25   Exam shows retinopathy? Eye Exam shows Diabetic Retinopathy?: Yes  Last diabetic foot exam: Last Foot Exam: 03/12/25    Nephropathy screening:    Continue ace /arb rx.    Lab Results   Component Value Date    EGFRCR 59 (L) 02/25/2025    MICROALBCREA 4.4 02/25/2025     LIPID screening:    Lab Results   Component Value Date    CHOLEST 185 02/25/2025     (H) 02/25/2025    TRIG 186 (H) 02/25/2025    HDL 33 (L) 02/25/2025    FASTING Yes 02/25/2025    FASTING Yes 02/25/2025     Cholesterol Lowering Medications            atorvastatin 20 MG Oral Tab             Code selection for this visit was based on time spent (43 min ) on date of service in preparing to see the patient, obtaining and/or reviewing separately obtained history,evaluating patient, reviewing blood glucose trends/patterns, discussing treatment options performing a medically appropriate examination, counseling and educating the patient/family/caregiver, ordering medications or testing, referring and communicating with other healthcare providers, documenting clinical information in the EHR, independently interpreting results and communicating results to the patient/family/caregiver and care coordination with the patient's other providers.   The risks and benefits of my recommendations, as well as other treatment options were discussed with the patient today. questions were also answered to the best of my knowledge.

## 2025-03-12 ENCOUNTER — NURSE ONLY (OUTPATIENT)
Dept: INTERNAL MEDICINE CLINIC | Facility: CLINIC | Age: 45
End: 2025-03-12
Payer: COMMERCIAL

## 2025-03-12 ENCOUNTER — OFFICE VISIT (OUTPATIENT)
Dept: ENDOCRINOLOGY CLINIC | Facility: CLINIC | Age: 45
End: 2025-03-12
Payer: COMMERCIAL

## 2025-03-12 VITALS
SYSTOLIC BLOOD PRESSURE: 128 MMHG | OXYGEN SATURATION: 96 % | RESPIRATION RATE: 17 BRPM | HEART RATE: 84 BPM | WEIGHT: 290 LBS | BODY MASS INDEX: 34 KG/M2 | DIASTOLIC BLOOD PRESSURE: 70 MMHG

## 2025-03-12 DIAGNOSIS — E11.65 TYPE 2 DIABETES MELLITUS WITH HYPERGLYCEMIA, WITHOUT LONG-TERM CURRENT USE OF INSULIN (HCC): ICD-10-CM

## 2025-03-12 DIAGNOSIS — E66.9 OBESITY (BMI 30-39.9): ICD-10-CM

## 2025-03-12 DIAGNOSIS — E11.65 TYPE 2 DIABETES MELLITUS WITH HYPERGLYCEMIA, WITHOUT LONG-TERM CURRENT USE OF INSULIN (HCC): Primary | ICD-10-CM

## 2025-03-12 DIAGNOSIS — E78.5 DYSLIPIDEMIA: ICD-10-CM

## 2025-03-12 LAB
GLUCOSE BLOOD: 126
TEST STRIP LOT #: NORMAL NUMERIC

## 2025-03-12 PROCEDURE — 99215 OFFICE O/P EST HI 40 MIN: CPT | Performed by: NURSE PRACTITIONER

## 2025-03-12 PROCEDURE — G2211 COMPLEX E/M VISIT ADD ON: HCPCS | Performed by: NURSE PRACTITIONER

## 2025-03-12 PROCEDURE — 82947 ASSAY GLUCOSE BLOOD QUANT: CPT | Performed by: NURSE PRACTITIONER

## 2025-03-12 PROCEDURE — 3074F SYST BP LT 130 MM HG: CPT | Performed by: NURSE PRACTITIONER

## 2025-03-12 PROCEDURE — 92229 IMG RTA DETC/MNTR DS POC ALY: CPT | Performed by: NURSE PRACTITIONER

## 2025-03-12 PROCEDURE — 3078F DIAST BP <80 MM HG: CPT | Performed by: NURSE PRACTITIONER

## 2025-03-12 PROCEDURE — 2026F EYE IMG VALID EVC RTNOPTHY: CPT | Performed by: NURSE PRACTITIONER

## 2025-03-12 RX ORDER — SEMAGLUTIDE 2.68 MG/ML
2 INJECTION, SOLUTION SUBCUTANEOUS WEEKLY
Qty: 9 ML | Refills: 1 | Status: SHIPPED | OUTPATIENT
Start: 2025-03-12

## 2025-03-12 RX ORDER — ATORVASTATIN CALCIUM 20 MG/1
20 TABLET, FILM COATED ORAL DAILY
Qty: 90 TABLET | Refills: 3 | Status: SHIPPED | OUTPATIENT
Start: 2025-03-12

## 2025-03-12 NOTE — PATIENT INSTRUCTIONS
A1C 7.1% ( up from 6.2%)  Repeat labs in 3 months (at least before your next appointment with me)       Having diabetes, and high cholesterol is not good for your heart health. Please resume the atorvastatin daily to help lower your cholesterol levels and it is Ok to take atorvastatin in the morning     Please make appointment with ophthalmologist  since the retina camera identified an issue with your blood vessels in the retina       Continue   Ozempic 2.0mg subcutaneous once weekly     Incorporating exercise does really help diabetes as well as other health benefits  ( see below )       Some of the most important benefits from starting and maintaining an exercise regimen will be those we do not see, including:      Reduced insulin resistance.      Improved BG control in Diabetes .      Improved blood pressure.      Improved cholesterol.      Stress management.      Improved function in everyday life.      Improved mood.      Less depression.      Increased strength.      Denser bones to prevent osteoporosis.    For weight loss, > 12,000 steps is recommended goal   Please try to exercise at least 30 minutes daily   It can be done in different times of the day in separate segments (for example, 10 minutes walking 3 x daily )   Exercise acts like \"invisible insulin\" which lowers your blood sugars.   Exercise can also help your weight and lower cholesterol levels.       Fluctuations in blood sugars are best detected by testing your sugar with your meter.   In order for me to determine any patterns in your blood sugars, you will need to test your blood sugar 1- 2 times daily     It would be best to change up the times of day that you are testing your sugar.   Always test before breakfast (fasting) and then alternate testing blood sugar 1-2 hours after your meals.     American Diabetes Association: blood sugar targets:     Fasting blood sugar (before breakfast) Target:    (ideally less than 110)  2 hours after  eating less than 180 (ideally less than  150 )       Over 250: goal is  less than 5%

## 2025-06-02 ENCOUNTER — HOSPITAL ENCOUNTER (EMERGENCY)
Age: 45
Discharge: HOME | End: 2025-06-02
Attending: STUDENT IN AN ORGANIZED HEALTH CARE EDUCATION/TRAINING PROGRAM

## 2025-06-02 ENCOUNTER — APPOINTMENT (OUTPATIENT)
Dept: GENERAL RADIOLOGY | Age: 45
End: 2025-06-02

## 2025-06-02 VITALS
HEIGHT: 77 IN | SYSTOLIC BLOOD PRESSURE: 178 MMHG | RESPIRATION RATE: 15 BRPM | HEART RATE: 89 BPM | OXYGEN SATURATION: 97 % | TEMPERATURE: 97.9 F | DIASTOLIC BLOOD PRESSURE: 99 MMHG

## 2025-06-02 DIAGNOSIS — R03.0 ELEVATED BLOOD PRESSURE READING WITHOUT DIAGNOSIS OF HYPERTENSION: ICD-10-CM

## 2025-06-02 DIAGNOSIS — M79.674 PAIN IN TOE OF RIGHT FOOT: Primary | ICD-10-CM

## 2025-06-02 PROCEDURE — 99282 EMERGENCY DEPT VISIT SF MDM: CPT | Performed by: STUDENT IN AN ORGANIZED HEALTH CARE EDUCATION/TRAINING PROGRAM

## 2025-06-02 PROCEDURE — 73630 X-RAY EXAM OF FOOT: CPT

## 2025-06-02 ASSESSMENT — PAIN SCALES - GENERAL: PAINLEVEL_OUTOF10: 1

## 2025-08-13 ENCOUNTER — LAB ENCOUNTER (OUTPATIENT)
Dept: LAB | Age: 45
End: 2025-08-13
Attending: NURSE PRACTITIONER

## 2025-08-13 ENCOUNTER — PATIENT MESSAGE (OUTPATIENT)
Dept: ENDOCRINOLOGY CLINIC | Facility: CLINIC | Age: 45
End: 2025-08-13

## 2025-08-13 ENCOUNTER — OFFICE VISIT (OUTPATIENT)
Dept: ENDOCRINOLOGY CLINIC | Facility: CLINIC | Age: 45
End: 2025-08-13

## 2025-08-13 ENCOUNTER — TELEPHONE (OUTPATIENT)
Dept: ENDOCRINOLOGY CLINIC | Facility: CLINIC | Age: 45
End: 2025-08-13

## 2025-08-13 VITALS
OXYGEN SATURATION: 99 % | BODY MASS INDEX: 34 KG/M2 | WEIGHT: 290 LBS | DIASTOLIC BLOOD PRESSURE: 72 MMHG | RESPIRATION RATE: 17 BRPM | SYSTOLIC BLOOD PRESSURE: 124 MMHG | HEART RATE: 80 BPM

## 2025-08-13 DIAGNOSIS — E11.65 TYPE 2 DIABETES MELLITUS WITH HYPERGLYCEMIA, WITHOUT LONG-TERM CURRENT USE OF INSULIN (HCC): Primary | ICD-10-CM

## 2025-08-13 DIAGNOSIS — E11.69 DYSLIPIDEMIA ASSOCIATED WITH TYPE 2 DIABETES MELLITUS (HCC): ICD-10-CM

## 2025-08-13 DIAGNOSIS — E11.65 TYPE 2 DIABETES MELLITUS WITH HYPERGLYCEMIA, WITHOUT LONG-TERM CURRENT USE OF INSULIN (HCC): ICD-10-CM

## 2025-08-13 DIAGNOSIS — E66.01 MORBID (SEVERE) OBESITY DUE TO EXCESS CALORIES (HCC): ICD-10-CM

## 2025-08-13 DIAGNOSIS — E78.5 DYSLIPIDEMIA ASSOCIATED WITH TYPE 2 DIABETES MELLITUS (HCC): ICD-10-CM

## 2025-08-13 LAB
ALBUMIN SERPL-MCNC: 4.7 G/DL (ref 3.2–4.8)
ALBUMIN/GLOB SERPL: 1.7 (ref 1–2)
ALP LIVER SERPL-CCNC: 54 U/L (ref 45–117)
ALT SERPL-CCNC: 48 U/L (ref 10–49)
ANION GAP SERPL CALC-SCNC: 10 MMOL/L (ref 0–18)
AST SERPL-CCNC: 26 U/L (ref ?–34)
BILIRUB SERPL-MCNC: 0.6 MG/DL (ref 0.3–1.2)
BUN BLD-MCNC: 8 MG/DL (ref 9–23)
CALCIUM BLD-MCNC: 9.7 MG/DL (ref 8.7–10.6)
CHLORIDE SERPL-SCNC: 104 MMOL/L (ref 98–112)
CHOLEST SERPL-MCNC: 117 MG/DL (ref ?–200)
CO2 SERPL-SCNC: 30 MMOL/L (ref 21–32)
CREAT BLD-MCNC: 1.3 MG/DL (ref 0.7–1.3)
EGFRCR SERPLBLD CKD-EPI 2021: 69 ML/MIN/1.73M2 (ref 60–?)
FASTING PATIENT LIPID ANSWER: YES
FASTING STATUS PATIENT QL REPORTED: YES
GLOBULIN PLAS-MCNC: 2.8 G/DL (ref 2–3.5)
GLUCOSE BLD-MCNC: 108 MG/DL (ref 70–99)
GLUCOSE BLOOD: 120
HDLC SERPL-MCNC: 35 MG/DL (ref 40–59)
HEMOGLOBIN A1C: 6.6 % (ref 4.3–5.6)
LDLC SERPL CALC-MCNC: 68 MG/DL (ref ?–100)
NONHDLC SERPL-MCNC: 82 MG/DL (ref ?–130)
OSMOLALITY SERPL CALC.SUM OF ELEC: 297 MOSM/KG (ref 275–295)
POTASSIUM SERPL-SCNC: 4.3 MMOL/L (ref 3.5–5.1)
PROT SERPL-MCNC: 7.5 G/DL (ref 5.7–8.2)
SODIUM SERPL-SCNC: 144 MMOL/L (ref 136–145)
TEST STRIP LOT #: NORMAL NUMERIC
TRIGL SERPL-MCNC: 69 MG/DL (ref 30–149)
VLDLC SERPL CALC-MCNC: 10 MG/DL (ref 0–30)

## 2025-08-13 PROCEDURE — 80053 COMPREHEN METABOLIC PANEL: CPT

## 2025-08-13 PROCEDURE — 82947 ASSAY GLUCOSE BLOOD QUANT: CPT | Performed by: NURSE PRACTITIONER

## 2025-08-13 PROCEDURE — 80061 LIPID PANEL: CPT

## 2025-08-13 PROCEDURE — 99214 OFFICE O/P EST MOD 30 MIN: CPT | Performed by: NURSE PRACTITIONER

## 2025-08-13 PROCEDURE — 3078F DIAST BP <80 MM HG: CPT | Performed by: NURSE PRACTITIONER

## 2025-08-13 PROCEDURE — 36415 COLL VENOUS BLD VENIPUNCTURE: CPT

## 2025-08-13 PROCEDURE — 3074F SYST BP LT 130 MM HG: CPT | Performed by: NURSE PRACTITIONER

## 2025-08-13 PROCEDURE — 83036 HEMOGLOBIN GLYCOSYLATED A1C: CPT | Performed by: NURSE PRACTITIONER

## 2025-08-13 RX ORDER — TIRZEPATIDE 15 MG/.5ML
15 INJECTION, SOLUTION SUBCUTANEOUS WEEKLY
Qty: 6 ML | Refills: 1 | Status: SHIPPED | OUTPATIENT
Start: 2025-08-13

## 2025-08-14 ENCOUNTER — RESULTS FOLLOW-UP (OUTPATIENT)
Dept: ENDOCRINOLOGY CLINIC | Facility: CLINIC | Age: 45
End: 2025-08-14